# Patient Record
Sex: FEMALE | Race: WHITE | NOT HISPANIC OR LATINO | URBAN - METROPOLITAN AREA
[De-identification: names, ages, dates, MRNs, and addresses within clinical notes are randomized per-mention and may not be internally consistent; named-entity substitution may affect disease eponyms.]

---

## 2020-07-25 ENCOUNTER — TELEPHONE ENCOUNTER (OUTPATIENT)
Dept: URBAN - METROPOLITAN AREA CLINIC 13 | Facility: CLINIC | Age: 70
End: 2020-07-25

## 2020-07-25 RX ORDER — CHOLESTYRAMINE 4 G/9G
PLACE CONTENTS OF 1 LEVEL SCOOPFUL IN GLASS.  ADD 6 OUNCES OF WATER.  STIR TO UNIFORM CONSISTENCY AND DRINK POWDER, FOR SUSPENSION ORAL
Qty: 1 | Refills: 0 | OUTPATIENT
Start: 2019-10-10 | End: 2019-11-11

## 2020-07-25 RX ORDER — OMEGA-3/DHA/EPA/FISH OIL 500-1000MG
TAKE 2 CAPSULE DAILY CAPSULE ORAL
Refills: 0 | OUTPATIENT
Start: 2019-08-19 | End: 2019-11-11

## 2020-07-26 ENCOUNTER — TELEPHONE ENCOUNTER (OUTPATIENT)
Dept: URBAN - METROPOLITAN AREA CLINIC 13 | Facility: CLINIC | Age: 70
End: 2020-07-26

## 2020-07-26 RX ORDER — LEVOTHYROXINE SODIUM 0.05 MG/1
TAKE 1 TABLET DAILY TABLET ORAL
Refills: 0 | Status: ACTIVE | COMMUNITY
Start: 2019-08-19

## 2020-07-26 RX ORDER — GABAPENTIN 300 MG/1
TAKE 1 CAPSULE TWICE DAILY CAPSULE ORAL
Refills: 0 | Status: ACTIVE | COMMUNITY
Start: 2019-08-19

## 2020-07-26 RX ORDER — GUARN/MA-HUANG/P.GIN/S.GINSENG
TAKE 1 TABLET DAILY TABLET ORAL
Refills: 0 | Status: ACTIVE | COMMUNITY
Start: 2019-08-19

## 2020-07-26 RX ORDER — DENOSUMAB 60 MG/ML
INJECT SUBCUTANEOUSLY  60 MG / 1 ML EVERY 6 MONTHS INJECTION SUBCUTANEOUS
Refills: 0 | Status: ACTIVE | COMMUNITY

## 2020-07-26 RX ORDER — BACITRACIN ZINC AND POLYMYXIN B SULFATES 10000; 500 [USP'U]/G; [USP'U]/G
OINTMENT OPHTHALMIC
Qty: 4 | Refills: 0 | Status: ACTIVE | COMMUNITY
Start: 2019-04-10

## 2020-07-26 RX ORDER — MULTIVITAMIN
TAKE 1 TABLET DAILY TABLET ORAL
Refills: 0 | Status: ACTIVE | COMMUNITY
Start: 2019-08-19

## 2020-07-26 RX ORDER — B-COMPLEX WITH VITAMIN C
TAKE 1 TABLET DAILY TABLET ORAL
Refills: 0 | Status: ACTIVE | COMMUNITY
Start: 2019-08-19

## 2020-07-26 RX ORDER — BROMFENAC 0.76 MG/ML
SOLUTION/ DROPS OPHTHALMIC
Qty: 5 | Refills: 0 | Status: ACTIVE | COMMUNITY
Start: 2019-04-10

## 2020-07-26 RX ORDER — AMOXICILLIN 500 MG
TAKE 1 CAPSULE DAILY CAPSULE ORAL
Refills: 0 | Status: ACTIVE | COMMUNITY
Start: 2019-08-19

## 2020-07-26 RX ORDER — LOTEPREDNOL ETABONATE 5 MG/G
GEL OPHTHALMIC
Qty: 5 | Refills: 0 | Status: ACTIVE | COMMUNITY
Start: 2019-04-10

## 2020-07-26 RX ORDER — TEMAZEPAM 15 MG/1
TAKE 1 CAPSULE AT BEDTIME AS NEEDED CAPSULE ORAL
Refills: 0 | Status: ACTIVE | COMMUNITY
Start: 2019-08-19

## 2020-07-26 RX ORDER — ROSUVASTATIN CALCIUM 10 MG/1
TAKE 1 TABLET AT BEDTIME TABLET, FILM COATED ORAL
Refills: 0 | Status: ACTIVE | COMMUNITY
Start: 2019-08-19

## 2020-07-26 RX ORDER — FAMOTIDINE 40 MG/1
TAKE 1 TABLET  PRN TABLET ORAL
Refills: 0 | Status: ACTIVE | COMMUNITY

## 2021-11-23 ENCOUNTER — OFFICE VISIT (OUTPATIENT)
Dept: URBAN - METROPOLITAN AREA CLINIC 72 | Facility: CLINIC | Age: 71
End: 2021-11-23
Payer: MEDICARE

## 2021-11-23 VITALS
HEART RATE: 68 BPM | WEIGHT: 134 LBS | DIASTOLIC BLOOD PRESSURE: 76 MMHG | BODY MASS INDEX: 22.88 KG/M2 | RESPIRATION RATE: 18 BRPM | SYSTOLIC BLOOD PRESSURE: 125 MMHG | TEMPERATURE: 98.1 F | HEIGHT: 64 IN

## 2021-11-23 DIAGNOSIS — R19.7 DIARRHEA, UNSPECIFIED TYPE: ICD-10-CM

## 2021-11-23 PROCEDURE — 99213 OFFICE O/P EST LOW 20 MIN: CPT | Performed by: INTERNAL MEDICINE

## 2021-11-23 RX ORDER — TEMAZEPAM 15 MG/1
TAKE 1 CAPSULE AT BEDTIME AS NEEDED CAPSULE ORAL
Refills: 0 | Status: ACTIVE | COMMUNITY
Start: 2019-08-19

## 2021-11-23 RX ORDER — DENOSUMAB 60 MG/ML
INJECT SUBCUTANEOUSLY  60 MG / 1 ML EVERY 6 MONTHS INJECTION SUBCUTANEOUS
Refills: 0 | Status: DISCONTINUED | COMMUNITY

## 2021-11-23 RX ORDER — MULTIVITAMIN
TAKE 1 TABLET DAILY TABLET ORAL
Refills: 0 | Status: ACTIVE | COMMUNITY
Start: 2019-08-19

## 2021-11-23 RX ORDER — LOTEPREDNOL ETABONATE 5 MG/G
GEL OPHTHALMIC
Qty: 5 | Refills: 0 | Status: DISCONTINUED | COMMUNITY
Start: 2019-04-10

## 2021-11-23 RX ORDER — B-COMPLEX WITH VITAMIN C
TAKE 1 TABLET DAILY TABLET ORAL
Refills: 0 | Status: ACTIVE | COMMUNITY
Start: 2019-08-19

## 2021-11-23 RX ORDER — BROMFENAC 0.76 MG/ML
SOLUTION/ DROPS OPHTHALMIC
Qty: 5 | Refills: 0 | Status: DISCONTINUED | COMMUNITY
Start: 2019-04-10

## 2021-11-23 RX ORDER — AMOXICILLIN 500 MG
TAKE 1 CAPSULE DAILY CAPSULE ORAL
Refills: 0 | Status: ACTIVE | COMMUNITY
Start: 2019-08-19

## 2021-11-23 RX ORDER — FAMOTIDINE 40 MG/1
TAKE 1 TABLET  PRN TABLET ORAL
Refills: 0 | Status: ACTIVE | COMMUNITY

## 2021-11-23 RX ORDER — LEVOTHYROXINE SODIUM 0.05 MG/1
TAKE 1 TABLET DAILY TABLET ORAL
Refills: 0 | Status: ACTIVE | COMMUNITY
Start: 2019-08-19

## 2021-11-23 RX ORDER — GUARN/MA-HUANG/P.GIN/S.GINSENG
TAKE 1 TABLET DAILY TABLET ORAL
Refills: 0 | Status: ACTIVE | COMMUNITY
Start: 2019-08-19

## 2021-11-23 RX ORDER — GABAPENTIN 300 MG/1
TAKE 1 CAPSULE TWICE DAILY CAPSULE ORAL
Refills: 0 | Status: ACTIVE | COMMUNITY
Start: 2019-08-19

## 2021-11-23 RX ORDER — ROSUVASTATIN CALCIUM 10 MG/1
TAKE 1 TABLET AT BEDTIME TABLET, FILM COATED ORAL
Refills: 0 | Status: ACTIVE | COMMUNITY
Start: 2019-08-19

## 2021-11-23 RX ORDER — BACITRACIN ZINC AND POLYMYXIN B SULFATES 10000; 500 [USP'U]/G; [USP'U]/G
OINTMENT OPHTHALMIC
Qty: 4 | Refills: 0 | Status: DISCONTINUED | COMMUNITY
Start: 2019-04-10

## 2021-11-23 RX ORDER — ALENDRONATE SODIUM 70 MG/1
1 TABLET 30 MINUTES BEFORE THE FIRST FOOD, BEVERAGE OR MEDICINE OF THE DAY WITH PLAIN WATER TABLET ORAL
Status: ACTIVE | COMMUNITY

## 2021-11-23 NOTE — HPI-TODAY'S VISIT:
Mrs. Felder is a pleasant 70-year-old female who returns for follow-up.  She was last seen in our office on 2/10/2020.  Is a history of left-sided diverticulosis managed with antidiarrheals and cholestyramine.  Has a remote history of microscopic colitis.  She responded well to conservative therapy we last saw her. She reports that she has been doing relatively well but recently started using Voltaren gel for hip bursitis subsequently has had explosive diarrhea. She then discontinued the medication and her symptoms have gradually improved. She has had to take Imodium intermittently. She is no longer taking Voltaren and feels like things are gradually getting better but is concerned that she may have had a return of her microscopic colitis.

## 2021-12-17 ENCOUNTER — OFFICE VISIT (OUTPATIENT)
Dept: URBAN - METROPOLITAN AREA CLINIC 72 | Facility: CLINIC | Age: 71
End: 2021-12-17

## 2022-05-10 ENCOUNTER — OFFICE VISIT (OUTPATIENT)
Dept: URBAN - METROPOLITAN AREA CLINIC 72 | Facility: CLINIC | Age: 72
End: 2022-05-10

## 2022-06-17 ENCOUNTER — TELEPHONE ENCOUNTER (OUTPATIENT)
Dept: URBAN - METROPOLITAN AREA CLINIC 72 | Facility: CLINIC | Age: 72
End: 2022-06-17

## 2022-06-21 ENCOUNTER — LAB OUTSIDE AN ENCOUNTER (OUTPATIENT)
Dept: URBAN - METROPOLITAN AREA CLINIC 72 | Facility: CLINIC | Age: 72
End: 2022-06-21

## 2022-06-21 ENCOUNTER — OFFICE VISIT (OUTPATIENT)
Dept: URBAN - METROPOLITAN AREA CLINIC 72 | Facility: CLINIC | Age: 72
End: 2022-06-21
Payer: MEDICARE

## 2022-06-21 VITALS
SYSTOLIC BLOOD PRESSURE: 122 MMHG | HEIGHT: 64 IN | BODY MASS INDEX: 22.74 KG/M2 | WEIGHT: 133.2 LBS | DIASTOLIC BLOOD PRESSURE: 70 MMHG | HEART RATE: 64 BPM | TEMPERATURE: 97.5 F

## 2022-06-21 DIAGNOSIS — K21.9 GASTROESOPHAGEAL REFLUX DISEASE, UNSPECIFIED WHETHER ESOPHAGITIS PRESENT: ICD-10-CM

## 2022-06-21 DIAGNOSIS — R19.7 DIARRHEA, UNSPECIFIED TYPE: ICD-10-CM

## 2022-06-21 PROCEDURE — 99214 OFFICE O/P EST MOD 30 MIN: CPT | Performed by: INTERNAL MEDICINE

## 2022-06-21 RX ORDER — ROSUVASTATIN CALCIUM 10 MG/1
TAKE 1 TABLET AT BEDTIME TABLET, FILM COATED ORAL
Refills: 0 | Status: ACTIVE | COMMUNITY
Start: 2019-08-19

## 2022-06-21 RX ORDER — ALENDRONATE SODIUM 70 MG/1
1 TABLET 30 MINUTES BEFORE THE FIRST FOOD, BEVERAGE OR MEDICINE OF THE DAY WITH PLAIN WATER TABLET ORAL
Status: ON HOLD | COMMUNITY

## 2022-06-21 RX ORDER — B-COMPLEX WITH VITAMIN C
TAKE 1 TABLET DAILY TABLET ORAL
Refills: 0 | Status: ACTIVE | COMMUNITY
Start: 2019-08-19

## 2022-06-21 RX ORDER — GABAPENTIN 300 MG/1
TAKE 1 CAPSULE TWICE DAILY CAPSULE ORAL
Refills: 0 | Status: ACTIVE | COMMUNITY
Start: 2019-08-19

## 2022-06-21 RX ORDER — FAMOTIDINE 40 MG/1
TAKE 1 TABLET  PRN TABLET ORAL
Refills: 0 | Status: ACTIVE | COMMUNITY

## 2022-06-21 RX ORDER — LEVOTHYROXINE SODIUM 0.05 MG/1
TAKE 1 TABLET DAILY TABLET ORAL
Refills: 0 | Status: ACTIVE | COMMUNITY
Start: 2019-08-19

## 2022-06-21 RX ORDER — TEMAZEPAM 15 MG/1
TAKE 1 CAPSULE AT BEDTIME AS NEEDED CAPSULE ORAL
Refills: 0 | Status: ACTIVE | COMMUNITY
Start: 2019-08-19

## 2022-06-21 RX ORDER — MULTIVITAMIN
TAKE 1 TABLET DAILY TABLET ORAL
Refills: 0 | Status: ACTIVE | COMMUNITY
Start: 2019-08-19

## 2022-06-21 RX ORDER — GUARN/MA-HUANG/P.GIN/S.GINSENG
TAKE 1 TABLET DAILY TABLET ORAL
Refills: 0 | Status: ACTIVE | COMMUNITY
Start: 2019-08-19

## 2022-06-21 RX ORDER — AMOXICILLIN 500 MG
TAKE 1 CAPSULE DAILY CAPSULE ORAL
Refills: 0 | Status: ACTIVE | COMMUNITY
Start: 2019-08-19

## 2022-06-21 NOTE — HPI-TODAY'S VISIT:
Mrs. Felder returns for follow-up, she is a 71-year-old female last seen in office on 11/23/2021.  She has history of left-sided diverticulosis and a remote history of microscopic colitis.  She has been maintained on antidiarrheals and cholestyramine as needed.  She used Voltaren gel in the past for bursitis and developed explosive diarrhea.  Patient symptoms improved when she stopped the medication.  We advised that she continue Imodium and restart high-fiber diet and if symptoms persisted we offered consideration of flexible sigmoidoscopy.  She then called the office on 6/17/2022 with diarrhea and left lower quadrant pain.  No fever.  Still having issues with GERD, on famotidine 40 mg as needed with good response when she takes the medication.  Trying to work through food intolerances  Reports ongoing issues with diarrhea, but will have explosive diarrhea 3 times a week ultimately has to take Imodium 2-3 times a day to get symptoms to stop.  She is distraught by her symptoms and becomes tearful today.  Last colonoscopy in 2019 with hyperplastic rectal polyps done for screening purposes.

## 2022-07-01 ENCOUNTER — OFFICE VISIT (OUTPATIENT)
Dept: URBAN - METROPOLITAN AREA MEDICAL CENTER 40 | Facility: MEDICAL CENTER | Age: 72
End: 2022-07-01
Payer: MEDICARE

## 2022-07-01 DIAGNOSIS — R19.7 ACUTE DIARRHEA: ICD-10-CM

## 2022-07-01 DIAGNOSIS — K57.30 ACQUIRED DIVERTICULOSIS OF COLON: ICD-10-CM

## 2022-07-01 DIAGNOSIS — K21.9 ACID REFLUX: ICD-10-CM

## 2022-07-01 DIAGNOSIS — K22.10 EROSIVE ESOPHAGITIS: ICD-10-CM

## 2022-07-01 DIAGNOSIS — K63.5 BENIGN COLON POLYP: ICD-10-CM

## 2022-07-01 DIAGNOSIS — K64.8 HEMORRHOID: ICD-10-CM

## 2022-07-01 DIAGNOSIS — K29.80 ACUTE DUODENITIS: ICD-10-CM

## 2022-07-01 DIAGNOSIS — K31.89 ACQUIRED DEFORMITY OF DUODENUM: ICD-10-CM

## 2022-07-01 PROCEDURE — 45331 SIGMOIDOSCOPY AND BIOPSY: CPT | Performed by: INTERNAL MEDICINE

## 2022-07-01 PROCEDURE — 45338 SIGMOIDOSCOPY W/TUMR REMOVE: CPT | Performed by: INTERNAL MEDICINE

## 2022-07-01 PROCEDURE — 45385 COLONOSCOPY W/LESION REMOVAL: CPT | Performed by: INTERNAL MEDICINE

## 2022-07-01 PROCEDURE — 43239 EGD BIOPSY SINGLE/MULTIPLE: CPT | Performed by: INTERNAL MEDICINE

## 2022-07-01 PROCEDURE — 45380 COLONOSCOPY AND BIOPSY: CPT | Performed by: INTERNAL MEDICINE

## 2022-07-11 PROBLEM — 40719004 EROSIVE ESOPHAGITIS: Status: ACTIVE | Noted: 2022-07-11

## 2022-07-19 ENCOUNTER — WEB ENCOUNTER (OUTPATIENT)
Dept: URBAN - METROPOLITAN AREA CLINIC 72 | Facility: CLINIC | Age: 72
End: 2022-07-19

## 2022-07-19 ENCOUNTER — OFFICE VISIT (OUTPATIENT)
Dept: URBAN - METROPOLITAN AREA CLINIC 72 | Facility: CLINIC | Age: 72
End: 2022-07-19
Payer: MEDICARE

## 2022-07-19 VITALS
SYSTOLIC BLOOD PRESSURE: 121 MMHG | HEART RATE: 77 BPM | BODY MASS INDEX: 22.77 KG/M2 | DIASTOLIC BLOOD PRESSURE: 73 MMHG | HEIGHT: 64 IN | WEIGHT: 133.4 LBS | TEMPERATURE: 98.3 F

## 2022-07-19 DIAGNOSIS — K57.90 DIVERTICULOSIS: ICD-10-CM

## 2022-07-19 DIAGNOSIS — K21.9 GASTROESOPHAGEAL REFLUX DISEASE, UNSPECIFIED WHETHER ESOPHAGITIS PRESENT: ICD-10-CM

## 2022-07-19 DIAGNOSIS — R19.7 DIARRHEA, UNSPECIFIED TYPE: ICD-10-CM

## 2022-07-19 PROBLEM — 235595009 GASTROESOPHAGEAL REFLUX DISEASE: Status: ACTIVE | Noted: 2022-06-21

## 2022-07-19 PROBLEM — 397881000: Status: ACTIVE | Noted: 2022-07-19

## 2022-07-19 PROCEDURE — 99214 OFFICE O/P EST MOD 30 MIN: CPT | Performed by: INTERNAL MEDICINE

## 2022-07-19 RX ORDER — B-COMPLEX WITH VITAMIN C
TAKE 1 TABLET DAILY TABLET ORAL
Refills: 0 | Status: ACTIVE | COMMUNITY
Start: 2019-08-19

## 2022-07-19 RX ORDER — GABAPENTIN 300 MG/1
TAKE 1 CAPSULE TWICE DAILY CAPSULE ORAL
Refills: 0 | Status: ACTIVE | COMMUNITY
Start: 2019-08-19

## 2022-07-19 RX ORDER — MULTIVITAMIN
TAKE 1 TABLET DAILY TABLET ORAL
Refills: 0 | Status: ACTIVE | COMMUNITY
Start: 2019-08-19

## 2022-07-19 RX ORDER — ALENDRONATE SODIUM 70 MG/1
1 TABLET 30 MINUTES BEFORE THE FIRST FOOD, BEVERAGE OR MEDICINE OF THE DAY WITH PLAIN WATER TABLET ORAL
Status: ON HOLD | COMMUNITY

## 2022-07-19 RX ORDER — ROSUVASTATIN CALCIUM 10 MG/1
TAKE 1 TABLET AT BEDTIME TABLET, FILM COATED ORAL
Refills: 0 | Status: ACTIVE | COMMUNITY
Start: 2019-08-19

## 2022-07-19 RX ORDER — TEMAZEPAM 15 MG/1
TAKE 1 CAPSULE AT BEDTIME AS NEEDED CAPSULE ORAL
Refills: 0 | Status: ACTIVE | COMMUNITY
Start: 2019-08-19

## 2022-07-19 RX ORDER — FAMOTIDINE 40 MG/1
TAKE 1 TABLET  PRN TABLET ORAL ONCE A DAY
Qty: 90 | Refills: 0

## 2022-07-19 RX ORDER — AMOXICILLIN 500 MG
TAKE 1 CAPSULE DAILY CAPSULE ORAL
Refills: 0 | Status: ACTIVE | COMMUNITY
Start: 2019-08-19

## 2022-07-19 RX ORDER — FAMOTIDINE 40 MG/1
TAKE 1 TABLET  PRN TABLET ORAL
Refills: 0 | Status: ACTIVE | COMMUNITY

## 2022-07-19 RX ORDER — LEVOTHYROXINE SODIUM 0.05 MG/1
TAKE 1 TABLET DAILY TABLET ORAL
Refills: 0 | Status: ACTIVE | COMMUNITY
Start: 2019-08-19

## 2022-07-19 RX ORDER — BUDESONIDE 3 MG/1
9MG DAILY FOR 4 WEEKS, THEN 6MG DAILY FOR 2 WEEKS, THEN 3 MG DAILY FOR 2 WEEKS THEN STOP CAPSULE, COATED PELLETS ORAL ONCE A DAY
Qty: 120 | Refills: 0 | OUTPATIENT
Start: 2022-07-19

## 2022-07-19 RX ORDER — GUARN/MA-HUANG/P.GIN/S.GINSENG
TAKE 1 TABLET DAILY TABLET ORAL
Refills: 0 | Status: ACTIVE | COMMUNITY
Start: 2019-08-19

## 2022-07-19 NOTE — HPI-TODAY'S VISIT:
Mrs. Melton returns for follow-up.  Recall she is a 71-year-old female last seen in office on 6/21/2022.  She has a history of left-sided diverticulosis and a remote history of microscopic colitis.  She has been maintained on antidiarrheals and cholestyramine as needed.  She used Voltaren gel in the past for bursitis and developed explosive diarrhea.  Patient reported that symptoms improve when she stopped the medication.  She has been using Imodium and high-fiber diet.  We discussed consideration of flexible sigmoidoscopy if her symptoms return and worsen.  They unfortunately did.  She reported explosive diarrhea 3 times a week.  Her symptoms were very stressful for her.  She was distraught over her current scenario. In addition she has issues with GERD despite famotidine 40 mg as needed.  We ultimately decided to proceed with upper endoscopy and flexible sigmoidoscopy.  These were performed without difficulty on 7/1/2022.  Upper endoscopy had a mildly inflamed duodenum, normal stomach aside from mild antral erythema and irregular appearing Z-line, normal esophagus.  Flexible sigmoidoscopy had diverticulosis on the examined colon.  Random biopsies were performed to rule out colitis.  There is a 5 mm sessile descending colon polyp was removed via cold snare.  There is significant diverticulosis at approximately 15 cm from the rectum with associated edema causing some narrowing, this was easily traversed and there is no active signs of diverticulitis or segmental colitis associated with diverticulosis.  Small nonbleeding hemorrhoids were also appreciated.  Biopsies returned showing mild chronic peptic duodenitis, normal gastric biopsies, mild erosive esophagitis and normal random colon biopsies, the descending colon polyp was benign colonic mucosa that was clinically polypoid.  She is still experiencing numerous episodes of diarrhea.  They happen weekly.  She does respond to Imodium.  She has yet to be able to find a trigger for these.  She is very distraught over the symptoms that she is getting.  She feels exactly like she felt when she had microscopic colitis but again biopsies were unremarkable.  We discussed prophylactically treating her as if she had microscopic colitis even though the biopsies were negative.  She is very interested in this.  We also discussed possibility of irritable bowel syndrome versus other malabsorptive scenario.  She does note that she has been intolerant of ice cream and thinks she may have a milk intolerance.

## 2022-07-26 ENCOUNTER — OFFICE VISIT (OUTPATIENT)
Dept: URBAN - METROPOLITAN AREA CLINIC 72 | Facility: CLINIC | Age: 72
End: 2022-07-26

## 2022-08-01 ENCOUNTER — WEB ENCOUNTER (OUTPATIENT)
Dept: URBAN - METROPOLITAN AREA CLINIC 72 | Facility: CLINIC | Age: 72
End: 2022-08-01

## 2022-08-05 ENCOUNTER — DASHBOARD ENCOUNTERS (OUTPATIENT)
Age: 72
End: 2022-08-05

## 2022-08-05 ENCOUNTER — OFFICE VISIT (OUTPATIENT)
Dept: URBAN - METROPOLITAN AREA CLINIC 72 | Facility: CLINIC | Age: 72
End: 2022-08-05
Payer: MEDICARE

## 2022-08-05 VITALS
TEMPERATURE: 98.5 F | WEIGHT: 133.4 LBS | HEART RATE: 70 BPM | HEIGHT: 64 IN | SYSTOLIC BLOOD PRESSURE: 143 MMHG | DIASTOLIC BLOOD PRESSURE: 80 MMHG | BODY MASS INDEX: 22.77 KG/M2

## 2022-08-05 DIAGNOSIS — R19.7 DIARRHEA, UNSPECIFIED TYPE: ICD-10-CM

## 2022-08-05 PROCEDURE — 99214 OFFICE O/P EST MOD 30 MIN: CPT | Performed by: INTERNAL MEDICINE

## 2022-08-05 RX ORDER — BUDESONIDE 3 MG/1
9MG DAILY FOR 4 WEEKS, THEN 6MG DAILY FOR 2 WEEKS, THEN 3 MG DAILY FOR 2 WEEKS THEN STOP CAPSULE, COATED PELLETS ORAL ONCE A DAY
Qty: 120 | Refills: 0 | Status: ACTIVE | COMMUNITY
Start: 2022-07-19

## 2022-08-05 RX ORDER — ROSUVASTATIN CALCIUM 10 MG/1
TAKE 1 TABLET AT BEDTIME TABLET, FILM COATED ORAL
Refills: 0 | Status: ACTIVE | COMMUNITY
Start: 2019-08-19

## 2022-08-05 RX ORDER — AMOXICILLIN 500 MG
TAKE 1 CAPSULE DAILY CAPSULE ORAL
Refills: 0 | Status: ACTIVE | COMMUNITY
Start: 2019-08-19

## 2022-08-05 RX ORDER — GABAPENTIN 300 MG/1
TAKE 1 CAPSULE TWICE DAILY CAPSULE ORAL
Refills: 0 | Status: ACTIVE | COMMUNITY
Start: 2019-08-19

## 2022-08-05 RX ORDER — B-COMPLEX WITH VITAMIN C
TAKE 1 TABLET DAILY TABLET ORAL
Refills: 0 | Status: ACTIVE | COMMUNITY
Start: 2019-08-19

## 2022-08-05 RX ORDER — COLESEVELAM HYDROCHLORIDE 625 MG/1
3 TABLETS WITH MEALS TABLET, COATED ORAL TWICE A DAY
Qty: 180 | OUTPATIENT
Start: 2022-08-05

## 2022-08-05 RX ORDER — GUARN/MA-HUANG/P.GIN/S.GINSENG
TAKE 1 TABLET DAILY TABLET ORAL
Refills: 0 | Status: ACTIVE | COMMUNITY
Start: 2019-08-19

## 2022-08-05 RX ORDER — FAMOTIDINE 40 MG/1
TAKE 1 TABLET  PRN TABLET ORAL ONCE A DAY
Qty: 90 | Refills: 0 | Status: ACTIVE | COMMUNITY

## 2022-08-05 RX ORDER — TEMAZEPAM 15 MG/1
TAKE 1 CAPSULE AT BEDTIME AS NEEDED CAPSULE ORAL
Refills: 0 | Status: ACTIVE | COMMUNITY
Start: 2019-08-19

## 2022-08-05 RX ORDER — MULTIVITAMIN
TAKE 1 TABLET DAILY TABLET ORAL
Refills: 0 | Status: ACTIVE | COMMUNITY
Start: 2019-08-19

## 2022-08-05 RX ORDER — ALENDRONATE SODIUM 70 MG/1
1 TABLET 30 MINUTES BEFORE THE FIRST FOOD, BEVERAGE OR MEDICINE OF THE DAY WITH PLAIN WATER TABLET ORAL
Status: ON HOLD | COMMUNITY

## 2022-08-05 RX ORDER — LEVOTHYROXINE SODIUM 0.05 MG/1
TAKE 1 TABLET DAILY TABLET ORAL
Refills: 0 | Status: ACTIVE | COMMUNITY
Start: 2019-08-19

## 2022-08-05 NOTE — HPI-TODAY'S VISIT:
Ms. Felder returns for follow-up.  Recall she is a 71-year-old last seen in office on 7/18/2022.  She has a history of left-sided diverticulosis and a remote history of microscopic colitis maintained on antidiarrheals and cholestyramine as needed.  She has been struggling with GERD symptoms that has been relatively well controlled with famotidine.  Given ongoing reflux and diarrhea she underwent upper endoscopy and flexible sigmoidoscopy.  Upper endoscopy was relatively unremarkable, flexible sigmoidoscopy showed diverticulosis and a descending colon polyp as well as some edematous tissue approximately 15 cm from the rectum.  The polyp removed was benign mucosa there is mild erosive esophagitis on upper endoscopy biopsies and random biopsies to rule out microscopic colitis were normal.  Encouraged her to work on dietary and lifestyle modifications.  She was interested in trying budesonide despite the biopsy showing no evidence of colitis to see if this helped. She brings in food journal, has had days where she has had greater than 10 bowel movements a day and has taken Imodium for few days then back up to 4-5 bowel movements.  Of note she is eating a lot of dairy and artificial sweeteners and processed foods.  I encouraged her to cut these out of her diet.  We discussed the use of Imodium.  Discussed use of cholestyramine or some other bile binding agent.  Also discussed possibility of EPI versus IBS-D

## 2022-08-24 ENCOUNTER — TELEPHONE ENCOUNTER (OUTPATIENT)
Dept: URBAN - METROPOLITAN AREA CLINIC 72 | Facility: CLINIC | Age: 72
End: 2022-08-24

## 2022-09-28 ENCOUNTER — OFFICE VISIT (OUTPATIENT)
Dept: URBAN - METROPOLITAN AREA CLINIC 72 | Facility: CLINIC | Age: 72
End: 2022-09-28

## 2022-12-06 ENCOUNTER — OFFICE VISIT (OUTPATIENT)
Dept: URBAN - METROPOLITAN AREA CLINIC 72 | Facility: CLINIC | Age: 72
End: 2022-12-06